# Patient Record
Sex: FEMALE
[De-identification: names, ages, dates, MRNs, and addresses within clinical notes are randomized per-mention and may not be internally consistent; named-entity substitution may affect disease eponyms.]

---

## 2022-05-15 PROBLEM — 166643006 LIVER ENZYMES ABNORMAL: Status: ACTIVE | Noted: 2022-05-15

## 2022-05-15 PROBLEM — 34000006 CROHN DISEASE: Status: ACTIVE | Noted: 2022-05-15

## 2022-05-15 PROBLEM — 161646004 H/O: HIGH RISK MEDICATION: Status: ACTIVE | Noted: 2022-05-15

## 2022-05-17 ENCOUNTER — DASHBOARD ENCOUNTERS (OUTPATIENT)
Age: 52
End: 2022-05-17

## 2022-05-20 ENCOUNTER — OFFICE VISIT (OUTPATIENT)
Dept: URBAN - METROPOLITAN AREA CLINIC 86 | Facility: CLINIC | Age: 52
End: 2022-05-20

## 2022-05-20 RX ORDER — CHOLESTYRAMINE 4 G/9G
1 PACKET MIXED WITH WATER OR NON-CARBONATED DRINK POWDER, FOR SUSPENSION ORAL TWICE A DAY
Status: ACTIVE | COMMUNITY

## 2022-05-20 RX ORDER — BUDESONIDE 3 MG/1
3 CAPSULES CAPSULE, COATED PELLETS ORAL ONCE A DAY
Status: ACTIVE | COMMUNITY

## 2022-05-20 NOTE — HPI-TODAY'S VISIT:
Patient is a 52-year-old female being referred in by Dr. Froy Tidwell MD and Prema for evaluation of abnormal liver labs. A copy of the note will be sent to the referring provider. Patient listed as being with a history of Crohn's disease, prediabetes and elevated liver labs.  Patient being managed with budesonide for her Crohn's disease issues. We were asked to specifically address liver function elevation for possible fatty liver versus autoimmune versus other medication or other effect.  Previous GI was Dr. Steven gutiérrez. January 2022 visit with her GI mentions that the patient was being seen for anemia.  Had been doing better with probiotic and Benefiber.  Had a previous EGD and colon with no celiac or gastritis with benign colon polyp that was seen. Patient had been on budesonide 3 mg 3 a day starting October 2021 as well as cholestyramine 4 g packet twice daily as needed.  Pantoprazole 20 mg a day was started then as well. June 2, 2021 colonoscopy revealed 4 mm polyp in ascending colon it was removed.  Nonbleeding hemorrhoids were seen many small mouth diverticuli seen in sigmoid colon and descending colon.  EGD done that showed esophagus normal.  Duodenal was normal.  Cardia and gastric fundus were normal.  Normal esophagus.  Path reports wrist from the duodenum benign duodenal mucosa no evidence of celiac disease.  H. pylori stain was negative with gastric mucosa showing mild chronic inflammation.  Ascending colon polyp was bland spindle cell peripheralization they favor neuroma.  Epithelial changes suggestive of early hyperplastic polyp was seen. Labs sent showed from approximately March 2022 sodium 142 potassium 3.8 chloride 110 CO2 24 BUN 13 Gran 0.8 albumin 4.5 bilirubin 0.4 alk phos 139 AST 51 and ALT 62 labs count 5.8 RBC count up at 5.61 hemoglobin 13.4 hematocrit 44.2 plate count 253 MCV 78.8.  TSH 2.909 magnesium 2.2 hemoglobin A1c 6 cholesterol 201 triglycerides 126 HDL 54 Plan: 1.	Full screens. 2.	[facility] to start with. 3.	Check VPH. 4.	Resee post above.    Stressed to pt the need for social distancing and strict handwashing and wearing a mask and to follow any other new or added CDC recommendations as this is an evolving target. Duration of the visit was 0 minutes with 20 minutes of chart prep and 20 minutes by dox video and then by phone due to internet issues by phone for this TeleMed visit with time reviewing their prior and recent records and labs and discussing their current status and future plans for care for the patient.

## 2025-08-05 ENCOUNTER — OFFICE VISIT (OUTPATIENT)
Dept: URBAN - METROPOLITAN AREA CLINIC 19 | Facility: CLINIC | Age: 55
End: 2025-08-05

## 2025-08-12 ENCOUNTER — OFFICE VISIT (OUTPATIENT)
Dept: URBAN - METROPOLITAN AREA CLINIC 19 | Facility: CLINIC | Age: 55
End: 2025-08-12
Payer: COMMERCIAL

## 2025-08-12 DIAGNOSIS — R12 WATERBRASH SYMPTOM: ICD-10-CM

## 2025-08-12 DIAGNOSIS — K76.0 FATTY LIVER: ICD-10-CM

## 2025-08-12 DIAGNOSIS — K63.3 ULCERATED COLON: ICD-10-CM

## 2025-08-12 PROBLEM — 162030005: Status: ACTIVE | Noted: 2025-08-12

## 2025-08-12 PROBLEM — 34000006: Status: ACTIVE | Noted: 2025-08-12

## 2025-08-12 PROBLEM — 197321007: Status: ACTIVE | Noted: 2025-08-12

## 2025-08-12 PROBLEM — 46040000: Status: ACTIVE | Noted: 2025-08-12

## 2025-08-12 PROCEDURE — 99204 OFFICE O/P NEW MOD 45 MIN: CPT | Performed by: STUDENT IN AN ORGANIZED HEALTH CARE EDUCATION/TRAINING PROGRAM

## 2025-08-12 RX ORDER — CHOLESTYRAMINE 4 G/9G
1 PACKET MIXED WITH WATER OR NON-CARBONATED DRINK POWDER, FOR SUSPENSION ORAL TWICE A DAY
Status: ON HOLD | COMMUNITY

## 2025-08-12 RX ORDER — BUDESONIDE 3 MG/1
3 CAPSULES CAPSULE, COATED PELLETS ORAL ONCE A DAY
Status: ACTIVE | COMMUNITY

## 2025-08-22 ENCOUNTER — OFFICE VISIT (OUTPATIENT)
Dept: URBAN - METROPOLITAN AREA CLINIC 18 | Facility: CLINIC | Age: 55
End: 2025-08-22
Payer: COMMERCIAL

## 2025-08-22 DIAGNOSIS — K86.89 FATTY PANCREAS: ICD-10-CM

## 2025-08-22 DIAGNOSIS — K76.0 HEPATIC STEATOSIS: ICD-10-CM

## 2025-08-22 PROCEDURE — 76705 ECHO EXAM OF ABDOMEN: CPT | Performed by: STUDENT IN AN ORGANIZED HEALTH CARE EDUCATION/TRAINING PROGRAM

## 2025-08-22 RX ORDER — CHOLESTYRAMINE 4 G/9G
1 PACKET MIXED WITH WATER OR NON-CARBONATED DRINK POWDER, FOR SUSPENSION ORAL TWICE A DAY
Status: ON HOLD | COMMUNITY

## 2025-08-22 RX ORDER — BUDESONIDE 3 MG/1
3 CAPSULES CAPSULE, COATED PELLETS ORAL ONCE A DAY
Status: ACTIVE | COMMUNITY

## 2025-08-31 LAB
ABSOLUTE BASOPHILS: 31
ABSOLUTE EOSINOPHILS: 166
ABSOLUTE LYMPHOCYTES: 2579
ABSOLUTE MONOCYTES: 645
ABSOLUTE NEUTROPHILS: 1778
ACTIN (SMOOTH MUSCLE) ANTIBODY (IGG): <20
ALBUMIN/GLOBULIN RATIO: 1.4
ALBUMIN: 4.2
ALKALINE PHOSPHATASE: 97
ALT: 18
ANA SCREEN, IFA: NEGATIVE
AST: 19
BASOPHILS: 0.6
BILIRUBIN, TOTAL: 0.4
BUN/CREATININE RATIO: (no result)
C-REACTIVE PROTEIN, QUANT: <3
CALCIUM: 9.6
CARBON DIOXIDE: 27
CHLORIDE: 107
CREATININE: 0.74
EGFR: 95
EOSINOPHILS: 3.2
FERRITIN, SERUM: 47
FIB 4 INDEX: 1.08
FIB 4 INTERPRETATION: (no result)
FOLATE, SERUM: 10.7
GLOBULIN: 2.9
GLUCOSE: 90
HBSAG SCREEN: (no result)
HEMATOCRIT: 42.7
HEMOGLOBIN: 13.1
HEP A AB, IGM: (no result)
HEP B CORE AB, IGM: (no result)
HEPATITIS C ANTIBODY: (no result)
IGG, SUBCLASS 4: 12.3
IMMUNOGLOBULIN A: 274
INR: 1
INTERPRETATION: (no result)
IRON BIND.CAP.(TIBC): 318
IRON SATURATION: 25
IRON: 81
LYMPHOCYTES: 49.6
MCH: 24.3
MCHC: 30.7
MCV: 79.1
MITOCHONDRIAL (M2) ANTIBODY: <=20
MONOCYTES: 12.4
MPV: 11.1
NEUTROPHILS: 34.2
PLATELET COUNT: 228
PLATELET COUNT: 228
POTASSIUM: 4
PROTEIN, TOTAL: 7.1
PT: 10.3
RDW: 13.4
RED BLOOD CELL COUNT: 5.4
SED RATE BY MODIFIED: 6
SODIUM: 142
TISSUE TRANSGLUTAMINASE AB, IGA: <1
TSH W/REFLEX TO FT4: 2.09
UREA NITROGEN (BUN): 15
VITAMIN B12: 346
VITAMIN D,25-OH,TOTAL,IA: 43
WHITE BLOOD CELL COUNT: 5.2